# Patient Record
Sex: MALE | Race: BLACK OR AFRICAN AMERICAN | NOT HISPANIC OR LATINO | Employment: UNEMPLOYED | URBAN - METROPOLITAN AREA
[De-identification: names, ages, dates, MRNs, and addresses within clinical notes are randomized per-mention and may not be internally consistent; named-entity substitution may affect disease eponyms.]

---

## 2018-04-21 ENCOUNTER — HOSPITAL ENCOUNTER (OUTPATIENT)
Dept: RADIOLOGY | Facility: OTHER | Age: 53
Discharge: HOME | End: 2018-04-21
Attending: PHYSICIAN ASSISTANT
Payer: MEDICAID

## 2018-04-21 DIAGNOSIS — M23.91 ACUTE INTERNAL DERANGEMENT OF KNEE, RIGHT: ICD-10-CM

## 2018-04-21 PROCEDURE — 73721 MRI JNT OF LWR EXTRE W/O DYE: CPT | Mod: TC,RT

## 2018-04-21 PROCEDURE — 73721 MRI JNT OF LWR EXTRE W/O DYE: CPT | Mod: 26,RT,, | Performed by: RADIOLOGY

## 2018-08-07 ENCOUNTER — CLINICAL SUPPORT (OUTPATIENT)
Dept: LAB | Facility: HOSPITAL | Age: 53
End: 2018-08-07
Attending: ORTHOPAEDIC SURGERY
Payer: MEDICAID

## 2018-08-07 ENCOUNTER — HOSPITAL ENCOUNTER (OUTPATIENT)
Dept: RADIOLOGY | Facility: HOSPITAL | Age: 53
Discharge: HOME | End: 2018-08-07
Attending: ORTHOPAEDIC SURGERY
Payer: MEDICAID

## 2018-08-07 DIAGNOSIS — Z01.818 PREOP EXAMINATION: ICD-10-CM

## 2018-08-07 DIAGNOSIS — Z01.818 PREOP EXAMINATION: Primary | ICD-10-CM

## 2018-08-07 PROCEDURE — 71046 X-RAY EXAM CHEST 2 VIEWS: CPT | Mod: TC,FY

## 2018-08-07 PROCEDURE — 93010 EKG 12-LEAD: ICD-10-PCS | Mod: ,,, | Performed by: INTERNAL MEDICINE

## 2018-08-07 PROCEDURE — 93010 ELECTROCARDIOGRAM REPORT: CPT | Mod: ,,, | Performed by: INTERNAL MEDICINE

## 2018-08-07 PROCEDURE — 71046 X-RAY EXAM CHEST 2 VIEWS: CPT | Mod: 26,,, | Performed by: RADIOLOGY

## 2018-08-07 PROCEDURE — 93005 ELECTROCARDIOGRAM TRACING: CPT

## 2018-09-07 ENCOUNTER — HOSPITAL ENCOUNTER (EMERGENCY)
Facility: HOSPITAL | Age: 53
Discharge: HOME | End: 2018-09-07
Attending: EMERGENCY MEDICINE
Payer: MEDICAID

## 2018-09-07 VITALS
WEIGHT: 175 LBS | TEMPERATURE: 97 F | HEART RATE: 74 BPM | OXYGEN SATURATION: 98 % | SYSTOLIC BLOOD PRESSURE: 127 MMHG | RESPIRATION RATE: 20 BRPM | DIASTOLIC BLOOD PRESSURE: 85 MMHG | BODY MASS INDEX: 29.88 KG/M2 | HEIGHT: 64 IN

## 2018-09-07 DIAGNOSIS — M25.561 RIGHT KNEE PAIN: ICD-10-CM

## 2018-09-07 LAB
ALBUMIN SERPL BCP-MCNC: 3.9 G/DL
ALP SERPL-CCNC: 104 U/L
ALT SERPL W/O P-5'-P-CCNC: 25 U/L
ANION GAP SERPL CALC-SCNC: 10 MMOL/L
AST SERPL-CCNC: 24 U/L
BASOPHILS # BLD AUTO: 0.02 K/UL
BASOPHILS NFR BLD: 0.3 %
BILIRUB SERPL-MCNC: 0.4 MG/DL
BUN SERPL-MCNC: 12 MG/DL
CALCIUM SERPL-MCNC: 9.6 MG/DL
CHLORIDE SERPL-SCNC: 103 MMOL/L
CO2 SERPL-SCNC: 25 MMOL/L
CREAT SERPL-MCNC: 1 MG/DL
CRP SERPL-MCNC: 2.8 MG/L
DIFFERENTIAL METHOD: ABNORMAL
EOSINOPHIL # BLD AUTO: 0.1 K/UL
EOSINOPHIL NFR BLD: 1.4 %
ERYTHROCYTE [DISTWIDTH] IN BLOOD BY AUTOMATED COUNT: 13.2 %
ERYTHROCYTE [SEDIMENTATION RATE] IN BLOOD BY WESTERGREN METHOD: 11 MM/HR
EST. GFR  (AFRICAN AMERICAN): >60 ML/MIN/1.73 M^2
EST. GFR  (NON AFRICAN AMERICAN): >60 ML/MIN/1.73 M^2
GLUCOSE SERPL-MCNC: 104 MG/DL
HCT VFR BLD AUTO: 38.2 %
HGB BLD-MCNC: 13.2 G/DL
LYMPHOCYTES # BLD AUTO: 2.7 K/UL
LYMPHOCYTES NFR BLD: 37.5 %
MCH RBC QN AUTO: 29.2 PG
MCHC RBC AUTO-ENTMCNC: 34.6 G/DL
MCV RBC AUTO: 85 FL
MONOCYTES # BLD AUTO: 0.6 K/UL
MONOCYTES NFR BLD: 8.8 %
NEUTROPHILS # BLD AUTO: 3.7 K/UL
NEUTROPHILS NFR BLD: 51.7 %
PLATELET # BLD AUTO: 265 K/UL
PMV BLD AUTO: 10.9 FL
POTASSIUM SERPL-SCNC: 3.8 MMOL/L
PROT SERPL-MCNC: 7.6 G/DL
RBC # BLD AUTO: 4.52 M/UL
SODIUM SERPL-SCNC: 138 MMOL/L
WBC # BLD AUTO: 7.17 K/UL

## 2018-09-07 PROCEDURE — 96375 TX/PRO/DX INJ NEW DRUG ADDON: CPT

## 2018-09-07 PROCEDURE — 99284 EMERGENCY DEPT VISIT MOD MDM: CPT | Mod: 25

## 2018-09-07 PROCEDURE — 80053 COMPREHEN METABOLIC PANEL: CPT

## 2018-09-07 PROCEDURE — 85652 RBC SED RATE AUTOMATED: CPT

## 2018-09-07 PROCEDURE — 63600175 PHARM REV CODE 636 W HCPCS: Performed by: EMERGENCY MEDICINE

## 2018-09-07 PROCEDURE — 96374 THER/PROPH/DIAG INJ IV PUSH: CPT

## 2018-09-07 PROCEDURE — 85025 COMPLETE CBC W/AUTO DIFF WBC: CPT

## 2018-09-07 PROCEDURE — 86140 C-REACTIVE PROTEIN: CPT

## 2018-09-07 RX ORDER — ESOMEPRAZOLE MAGNESIUM 40 MG/1
40 CAPSULE, DELAYED RELEASE ORAL
COMMUNITY
End: 2019-09-18

## 2018-09-07 RX ORDER — MORPHINE SULFATE 4 MG/ML
4 INJECTION, SOLUTION INTRAMUSCULAR; INTRAVENOUS
Status: COMPLETED | OUTPATIENT
Start: 2018-09-07 | End: 2018-09-07

## 2018-09-07 RX ORDER — ONDANSETRON 2 MG/ML
4 INJECTION INTRAMUSCULAR; INTRAVENOUS
Status: COMPLETED | OUTPATIENT
Start: 2018-09-07 | End: 2018-09-07

## 2018-09-07 RX ORDER — GABAPENTIN 300 MG/1
300 CAPSULE ORAL 3 TIMES DAILY
COMMUNITY

## 2018-09-07 RX ORDER — CEPHALEXIN 500 MG/1
500 CAPSULE ORAL EVERY 12 HOURS
Qty: 10 CAPSULE | Refills: 0 | Status: SHIPPED | OUTPATIENT
Start: 2018-09-07 | End: 2019-09-18

## 2018-09-07 RX ORDER — OXYCODONE AND ACETAMINOPHEN 5; 325 MG/1; MG/1
1 TABLET ORAL EVERY 4 HOURS PRN
COMMUNITY
End: 2019-09-18

## 2018-09-07 RX ADMIN — ONDANSETRON 4 MG: 2 INJECTION INTRAMUSCULAR; INTRAVENOUS at 06:09

## 2018-09-07 RX ADMIN — MORPHINE SULFATE 4 MG: 4 INJECTION, SOLUTION INTRAMUSCULAR; INTRAVENOUS at 06:09

## 2018-09-07 NOTE — ED TRIAGE NOTES
Pt states he was in therapy for his recent surgery to right knee when he was placed on a stationary bike and when he went to propel forward his incision opened up and pus came flying out. Pt states he felt pain last night while sitting at work , he states it felt like pressure to incision site. Pt last saw his surgeon on Weds with infection concerns and was sent home. Pt denies fever.

## 2018-09-08 NOTE — ED PROVIDER NOTES
Encounter Date: 9/7/2018       History     Chief Complaint   Patient presents with    Knee Pain     right knee pain began today at therapy had surgery aug 14 2018 reports yellow drainage not to inscision site during therapy       53-year-old male presents emergency department complaining of right knee pain and purulent drainage.  He is approximately 2 weeks status post surgery for cartilage repair to his right knee.  He reports  That he began having a warm/burning feeling to his right knee as well as increased pain diffusely last night.  No recent re-injury reported.  Today, when he was at physical therapy, he was doing the bike and he had spontaneous purulent drainage from the inferior aspect of his vertical incision to his right knee.  Reports persistent and constant pain that is worse with ambulation or movement without alleviating factors.  Denies any fever.  No other symptoms reported.          Review of patient's allergies indicates:   Allergen Reactions    Carbamazepine      Itchy (skin)^     Past Medical History:   Diagnosis Date    GERD (gastroesophageal reflux disease)      Past Surgical History:   Procedure Laterality Date    KNEE SURGERY Right 08/14/2018    [facility]     History reviewed. No pertinent family history.  Social History     Tobacco Use    Smoking status: Never Smoker    Smokeless tobacco: Never Used   Substance Use Topics    Alcohol use: No     Frequency: Never    Drug use: No     Review of Systems   Constitutional: Negative for chills, fatigue and fever.   HENT: Negative for congestion, sore throat and voice change.    Eyes: Negative for photophobia, pain and redness.   Respiratory: Negative for cough, choking and shortness of breath.    Cardiovascular: Negative for chest pain, palpitations and leg swelling.   Gastrointestinal: Negative for abdominal pain, diarrhea, nausea and vomiting.   Genitourinary: Negative for dysuria, frequency and urgency.   Musculoskeletal: Negative  for back pain, neck pain and neck stiffness.   Neurological: Negative for seizures, speech difficulty, light-headedness, numbness and headaches.   All other systems reviewed and are negative.      Physical Exam     Initial Vitals   BP Pulse Resp Temp SpO2   09/07/18 1720 09/07/18 1720 09/07/18 1720 09/07/18 1720 09/07/18 1832   (!) 140/89 80 18 98.6 °F (37 °C) 98 %      MAP       --                Physical Exam    Nursing note and vitals reviewed.  Constitutional: He appears well-developed and well-nourished. No distress.   HENT:   Head: Normocephalic and atraumatic.   Eyes: Conjunctivae and EOM are normal. Pupils are equal, round, and reactive to light.   Neck: Normal range of motion. Neck supple. No tracheal deviation present.   Cardiovascular: Normal rate, regular rhythm, normal heart sounds and intact distal pulses.   Pulmonary/Chest: Breath sounds normal. No respiratory distress. He has no wheezes. He has no rhonchi. He has no rales.   Abdominal: Soft. Bowel sounds are normal. He exhibits no distension. There is no tenderness.   Musculoskeletal: He exhibits tenderness.        Legs:  Neurological: He is alert and oriented to person, place, and time. He has normal strength. No cranial nerve deficit or sensory deficit.   Skin: Skin is warm and dry. Capillary refill takes less than 2 seconds.         ED Course   Procedures  Labs Reviewed   CBC W/ AUTO DIFFERENTIAL - Abnormal; Notable for the following components:       Result Value    RBC 4.52 (*)     Hemoglobin 13.2 (*)     Hematocrit 38.2 (*)     All other components within normal limits   SEDIMENTATION RATE - Abnormal; Notable for the following components:    Sed Rate 11 (*)     All other components within normal limits   COMPREHENSIVE METABOLIC PANEL   C-REACTIVE PROTEIN            X-Rays:   Independently Interpreted Readings:   Other Readings:  Imaging interpreted by radiologist and visualized by me:     Imaging Results          X-Ray Knee 3 View Right (Final  result)  Result time 09/07/18 19:08:28    Final result by Girish Willett MD (09/07/18 19:08:28)                 Impression:      1. Well corticated ossific focus adjacent to the medial femoral condyle, given its well corticated nature, this may reflect remote injury related to Farida-Stieda type avulsion although correlation with any focal tenderness in the region is recommended.      Electronically signed by: Girish Willett MD  Date:    09/07/2018  Time:    19:08             Narrative:    EXAMINATION:  XR KNEE 3 VIEW RIGHT    CLINICAL HISTORY:  Pain in right knee    TECHNIQUE:  AP, lateral, and Merchant views of the right knee were performed.    COMPARISON:  None    FINDINGS:  Three views.    There is a well corticated ossific focus adjacent to the medial femoral condyle.  No large knee joint effusion.  No dislocation.  No convincing acute displaced fracture.                                Medical Decision Making:   Initial Assessment:     53-year-old male presents emergency department complaining of purulent drainage from the inferior aspect of his knee incision  Differential Diagnosis:    Wound infection, septic joint, seroma  Independently Interpreted Test(s):   I have ordered and independently interpreted X-rays - see prior notes.  Clinical Tests:   Lab Tests: Reviewed       <> Summary of Lab:  benign  ED Management:   ESR, CRP, white blood cell count benign.  Discussed with [facility] who have seen the patient in the department and agree with discharge with prescription for antibiotics, likely a very superficial infection secondary to suture site, no evidence of septic joint.  Patient has follow-up in  4 days with his orthopedist.  Instructed him to return immediately if his symptoms worsen, patient comfortable with plan at this                      Clinical Impression:   The primary encounter diagnosis was Wound infection after surgery, initial encounter. A diagnosis of Right knee pain was also  pertinent to this visit.      Disposition:   Disposition: Discharged  Condition: Stable                        Darrell Mueller MD  09/07/18 2028

## 2018-09-08 NOTE — CONSULTS
[facility]  Consult Note    Consult:   Concern for right knee infection    HPI:  53yM, , underwent R knee cartiheal surgery on 8/14/18 by Dr. Luevano and Dr. Lockwood at [facility].  He has subsequently been going to PT and progressing.  He has complained of R knee pain and burning/tingling sensation about the lateral aspect anteriorly. He was last seen in clinic earlier this week and was noted to have wound healing issues along the inferior aspect of his arthrotomy incision.  He noticed a focal area of swelling at this site as well.  Today, at PT, he tried cycling for the first time. While cycling the focal area ruptured, decompressed, and releasing pus.  He denies fevers at home.  He has been ambulating without assistive devices at home.  He has been about to range the knee from roughly 0-90 at PT.  He feels the knee swelling has been gradually decreasing since surgery.    PMH:   Denies: DM, HIV, Hep  Past Medical History:   Diagnosis Date    GERD (gastroesophageal reflux disease)        PSH:    has a past surgical history that includes Knee surgery (Right, 08/14/2018).    SOCIAL:    reports that  has never smoked. he has never used smokeless tobacco. He reports that he does not drink alcohol or use drugs.    MEDS:   No current facility-administered medications on file prior to encounter.      Current Outpatient Medications on File Prior to Encounter   Medication Sig Dispense Refill    esomeprazole (NEXIUM) 40 MG capsule Take 40 mg by mouth before breakfast.      gabapentin (NEURONTIN) 300 MG capsule Take 300 mg by mouth 3 (three) times daily.      oxyCODONE-acetaminophen (PERCOCET) 5-325 mg per tablet Take 1 tablet by mouth every 4 (four) hours as needed for Pain.         ALLERGY:   Allergies as of 09/07/2018 - Reviewed 09/07/2018   Allergen Reaction Noted    Carbamazepine  06/14/2012       Vitals:  Vitals:    09/07/18 1832   BP: 136/82   Pulse: 86   Resp: 18   Temp: 98.5 °F (36.9 °C)        Labs:  Recent Labs   Lab  09/07/18   1823   WBC  7.17   HGB  13.2*   HCT  38.2*   PLT  265   MCV  85   RDW  13.2   NA  138   K  3.8   CL  103   CO2  25   BUN  12   CREATININE  1.0   GLU  104   PROT  7.6   ALBUMIN  3.9   BILITOT  0.4   AST  24   ALKPHOS  104   ALT  25     ESR: 11  CRP: 2.8    Coags:   Lab Results   Component Value Date    INR 0.9 08/07/2018    APTT 27.4 08/07/2018       Exam:  NAD, A+OX3  RRR  No increased WOB    RLE:  Mild knee effusion  No TTP about knee  No erythema  Arthrotomy incision fully healed except scabbed site along inferior portion  No active drainage, no surround erythema, no fluctuance, no expressible purulence  ROM 0-90 actively, with mild pain  Able to fully weight bear on RLE, ambulating with limp  Motor: + ehl, fhl, ta, gs  SILT: t/s/s/sp/dp, decreased lateral to arthrotomy  WWP    Radiology:  XR R knee:  cartiheal implant appears in place, no fractures/dislocations    Impression:  53yM s/p cartiheal implantation into R knee, with suture abscess - now decompressed    Plan:  - R knee does not appear infected based on clinical exam and laboratory studies.  - I believe the patient witnessed decompression of a suture abscess while on the cycle today at PT  - Will provide the patient with a 5 day script for Keflex  - He has an appointment scheduled with Dr. Luevano on Tuesday, will reassess the wound healing then.  - OTC NSAIDs, prn pain

## 2019-09-20 PROBLEM — Z98.890 STATUS POST ARTHROSCOPY OF KNEE: Status: ACTIVE | Noted: 2019-09-20

## 2019-10-05 ENCOUNTER — HOSPITAL ENCOUNTER (EMERGENCY)
Facility: OTHER | Age: 54
Discharge: HOME | End: 2019-10-05
Attending: EMERGENCY MEDICINE
Payer: MEDICAID

## 2019-10-05 VITALS
WEIGHT: 160 LBS | HEIGHT: 64 IN | DIASTOLIC BLOOD PRESSURE: 79 MMHG | TEMPERATURE: 98 F | BODY MASS INDEX: 27.31 KG/M2 | RESPIRATION RATE: 15 BRPM | OXYGEN SATURATION: 100 % | SYSTOLIC BLOOD PRESSURE: 120 MMHG | HEART RATE: 80 BPM

## 2019-10-05 DIAGNOSIS — L50.9 URTICARIA: Primary | ICD-10-CM

## 2019-10-05 PROCEDURE — 63600175 PHARM REV CODE 636 W HCPCS: Performed by: EMERGENCY MEDICINE

## 2019-10-05 PROCEDURE — S0028 INJECTION, FAMOTIDINE, 20 MG: HCPCS | Performed by: EMERGENCY MEDICINE

## 2019-10-05 PROCEDURE — 99284 EMERGENCY DEPT VISIT MOD MDM: CPT | Mod: 25

## 2019-10-05 PROCEDURE — 96374 THER/PROPH/DIAG INJ IV PUSH: CPT

## 2019-10-05 PROCEDURE — 96375 TX/PRO/DX INJ NEW DRUG ADDON: CPT

## 2019-10-05 PROCEDURE — 96361 HYDRATE IV INFUSION ADD-ON: CPT

## 2019-10-05 PROCEDURE — 25000003 PHARM REV CODE 250: Performed by: EMERGENCY MEDICINE

## 2019-10-05 RX ORDER — METHYLPREDNISOLONE 4 MG/1
TABLET ORAL
Qty: 1 PACKAGE | Refills: 0 | Status: SHIPPED | OUTPATIENT
Start: 2019-10-05 | End: 2019-10-26

## 2019-10-05 RX ORDER — METHYLPREDNISOLONE SOD SUCC 125 MG
125 VIAL (EA) INJECTION
Status: COMPLETED | OUTPATIENT
Start: 2019-10-05 | End: 2019-10-05

## 2019-10-05 RX ORDER — AMOXICILLIN 250 MG/1
250 CAPSULE ORAL
COMMUNITY
End: 2019-10-05

## 2019-10-05 RX ORDER — FAMOTIDINE 10 MG/ML
20 INJECTION INTRAVENOUS
Status: COMPLETED | OUTPATIENT
Start: 2019-10-05 | End: 2019-10-05

## 2019-10-05 RX ORDER — METHOCARBAMOL 500 MG/1
500 TABLET, FILM COATED ORAL 4 TIMES DAILY
COMMUNITY

## 2019-10-05 RX ORDER — DIPHENHYDRAMINE HYDROCHLORIDE 50 MG/ML
50 INJECTION INTRAMUSCULAR; INTRAVENOUS
Status: COMPLETED | OUTPATIENT
Start: 2019-10-05 | End: 2019-10-05

## 2019-10-05 RX ORDER — HYDROXYZINE HYDROCHLORIDE 25 MG/1
25 TABLET, FILM COATED ORAL EVERY 6 HOURS
Qty: 12 TABLET | Refills: 0 | Status: SHIPPED | OUTPATIENT
Start: 2019-10-05

## 2019-10-05 RX ADMIN — METHYLPREDNISOLONE SODIUM SUCCINATE 125 MG: 125 INJECTION, POWDER, FOR SOLUTION INTRAMUSCULAR; INTRAVENOUS at 07:10

## 2019-10-05 RX ADMIN — SODIUM CHLORIDE 1000 ML: 0.9 INJECTION, SOLUTION INTRAVENOUS at 07:10

## 2019-10-05 RX ADMIN — FAMOTIDINE 20 MG: 10 INJECTION, SOLUTION INTRAVENOUS at 07:10

## 2019-10-05 RX ADMIN — DIPHENHYDRAMINE HYDROCHLORIDE 50 MG: 50 INJECTION, SOLUTION INTRAMUSCULAR; INTRAVENOUS at 07:10

## 2019-10-05 NOTE — ED PROVIDER NOTES
"Encounter Date: 10/5/2019    SCRIBE #1 NOTE: I, Bee Gómez, am scribing for, and in the presence of, Dr. Jansen.       History     Chief Complaint   Patient presents with    Urticaria     Pt reports hives for 2 days, to trunk, behind kness/ears, tried benadryl cream with no relief, took po benadryl 2 days ago with no relief, did get Rx for cortisone cream but did not get filled.     Time seen by provider: 7:08 AM    This is a 54 y.o. male who presents with complaint of rash to trunk, arms, and legs that began three days ago. He reports associated itching and painful "knot" to his right axilla. Sx worsen after eating meals, though he does not note any specific foods. He denies difficulty swallowing, shortness of breath, or N/V. He notes his sister recently washed his clothes, but he is unsure if she used a new detergent. His dentist started him on amoxicillin on 9/19, but he has been noncompliant and has missed several doses. He has been prescribed steroid cream, but has not filled it yet. He denies taking benadryl today.    The history is provided by the patient.     Review of patient's allergies indicates:   Allergen Reactions    Carbamazepine      Itchy (skin)^     Past Medical History:   Diagnosis Date    GERD (gastroesophageal reflux disease)      Past Surgical History:   Procedure Laterality Date    CERVICAL SPINE SURGERY      KNEE SURGERY Right 08/14/2018    DR. Schuster at [facility]    NOSE SURGERY       No family history on file.  Social History     Tobacco Use    Smoking status: Never Smoker    Smokeless tobacco: Never Used   Substance Use Topics    Alcohol use: Yes     Frequency: Never     Comment: 6 pack a day    Drug use: No     Review of Systems   Constitutional: Negative for fever.   HENT: Negative for sore throat and trouble swallowing.    Respiratory: Negative for shortness of breath.    Cardiovascular: Negative for chest pain.   Gastrointestinal: Negative for nausea and vomiting. " "  Genitourinary: Negative for dysuria.   Musculoskeletal: Negative for back pain.   Skin: Positive for rash.        Positive for itching.   Neurological: Negative for weakness.   Hematological: Positive for adenopathy ("knot"). Does not bruise/bleed easily.       Physical Exam     Initial Vitals [10/05/19 0640]   BP Pulse Resp Temp SpO2   123/75 81 17 97.7 °F (36.5 °C) 99 %      MAP       --         Physical Exam    Nursing note and vitals reviewed.  Constitutional: He appears well-developed and well-nourished. He is not diaphoretic. No distress.   Appears uncomfortable.   HENT:   Head: Normocephalic and atraumatic.   Tolerating secretions.   Eyes: Conjunctivae and EOM are normal. Pupils are equal, round, and reactive to light.   Neck: Normal range of motion. Neck supple.   Cardiovascular: Normal rate, regular rhythm and normal heart sounds.   Pulmonary/Chest: Effort normal and breath sounds normal. No stridor. No respiratory distress. He has no wheezes.   Abdominal: Normal appearance.   Musculoskeletal: Normal range of motion.   Lymphadenopathy:   Tender lymphadenopathy to right axilla without evidence of cellulitis.   Neurological: He is alert and oriented to person, place, and time. No cranial nerve deficit.   Skin: Skin is warm and dry. Rash noted. There is erythema.   Confluent raised urticarial erythematous rash to trunk. No desquamating skin rash.  No rash to palms or soles   Psychiatric: He has a normal mood and affect. His behavior is normal. Thought content normal.         ED Course   Procedures  Labs Reviewed - No data to display       Imaging Results    None          Medical Decision Making:   History:   Old Medical Records: I decided to obtain old medical records.  Initial Assessment:   Urgent evaluation a 54-year-old gentleman with GERD here with complaints of diffuse body itching for the last 3 days without improvement this trial of Benadryl.  Patient has been amoxicillin recently, but denies prior " allergic reactions.  Patient denies new soaps or lotions, however unclear about detergent changes as family member does his laundry.  Patient denies shortness of breath, no GI symptoms.  On exam patient appears uncomfortable, has diffuse urticarial rash to the trunk, without skin sloughing.  No or pharyngeal involvement, no wheezing.  Will plan to treat as allergic reaction without clear etiology, administer steroids, IV fluids, antihistamines and reassess.        ED Management:  7:54 AM - Reassessed. Patient feeling improved. Slightly drowsy.  Will discharge home with steroids, Atarax and fu Allergy.            Scribe Attestation:   Scribe #1: I performed the above scribed service and the documentation accurately describes the services I performed. I attest to the accuracy of the note.    Attending Attestation:           Physician Attestation for Scribe:  Physician Attestation Statement for Scribe #1: I, Dr. Jansen, reviewed documentation, as scribed by Bee Gómez in my presence, and it is both accurate and complete.                    Clinical Impression:     1. Urticaria          Disposition:   Disposition: Discharged  Condition: Stable                        Faiza Jansen MD  10/05/19 0831

## 2019-10-05 NOTE — ED TRIAGE NOTES
Patient reports to ED with complaints of itching all over body for 3 days. No relief with use of benadryl. States the use of amoxicillin since 9/19 after a dental procedure. Denies new use of soaps, lotions, detergents. Patient AAOx3, VSS. Will continue to monitor closely.

## 2019-10-05 NOTE — ED NOTES
2 Patient identifiers, allergies, and medications verified.    LOC: Patient is awake, alert and oriented X3. Pt aware of environment with an appropriate affect and speaking appropriate.    APPEARANCE: Patient anxiously itching, patient is clean and well groomed; clothing is properly fastened.    SKIN: Skin warm and dry, normal skin turgor and moist mucus membranes, no rash or hives visible although patient states presence of rash from head to toe.     Musculoskeletal:  Normal ROM noted; moves all extremeties well, No swelling or tenderness noted.    RESPIRATORY: Airway is open and patent, unlabored, spontaneous bilateral respirations; normal effort and rate.     CARDIAC: Normal rate and rhythm, no peripheral edema noted, capillary refill < 3 seconds.     ABDOMEN: Soft and non-tender upon palpation, no distention noted.     PULSES: 2+; symmetrical in all 4 extremities    NEUROLOGIC: symmetrical facial expression; normal sensation to all 4 extremities.    VSS, will continue to monitor.